# Patient Record
Sex: MALE | Race: WHITE | NOT HISPANIC OR LATINO | Employment: FULL TIME | URBAN - METROPOLITAN AREA
[De-identification: names, ages, dates, MRNs, and addresses within clinical notes are randomized per-mention and may not be internally consistent; named-entity substitution may affect disease eponyms.]

---

## 2021-05-06 ENCOUNTER — PATIENT MESSAGE (OUTPATIENT)
Dept: RESEARCH | Facility: HOSPITAL | Age: 61
End: 2021-05-06

## 2024-07-01 ENCOUNTER — LAB VISIT (OUTPATIENT)
Dept: LAB | Facility: HOSPITAL | Age: 64
End: 2024-07-01
Attending: INTERNAL MEDICINE
Payer: COMMERCIAL

## 2024-07-01 ENCOUNTER — HOSPITAL ENCOUNTER (OUTPATIENT)
Dept: ENDOCRINOLOGY | Facility: CLINIC | Age: 64
Discharge: HOME | End: 2024-07-01
Attending: INTERNAL MEDICINE
Payer: COMMERCIAL

## 2024-07-01 ENCOUNTER — OFFICE VISIT (OUTPATIENT)
Dept: ENDOCRINOLOGY | Facility: CLINIC | Age: 64
End: 2024-07-01
Payer: COMMERCIAL

## 2024-07-01 VITALS
WEIGHT: 203.06 LBS | HEART RATE: 70 BPM | SYSTOLIC BLOOD PRESSURE: 90 MMHG | BODY MASS INDEX: 28.32 KG/M2 | OXYGEN SATURATION: 98 % | DIASTOLIC BLOOD PRESSURE: 60 MMHG

## 2024-07-01 DIAGNOSIS — E04.1 THYROID NODULE: ICD-10-CM

## 2024-07-01 DIAGNOSIS — E04.1 THYROID NODULE: Primary | ICD-10-CM

## 2024-07-01 LAB
T4 FREE SERPL-MCNC: 0.92 NG/DL (ref 0.71–1.51)
TSH SERPL DL<=0.005 MIU/L-ACNC: 1.32 UIU/ML (ref 0.4–4)

## 2024-07-01 PROCEDURE — 99999 PR PBB SHADOW E&M-NEW PATIENT-LVL III: CPT | Mod: PBBFAC,,, | Performed by: INTERNAL MEDICINE

## 2024-07-01 PROCEDURE — 36415 COLL VENOUS BLD VENIPUNCTURE: CPT | Performed by: INTERNAL MEDICINE

## 2024-07-01 PROCEDURE — 3078F DIAST BP <80 MM HG: CPT | Mod: CPTII,S$GLB,, | Performed by: INTERNAL MEDICINE

## 2024-07-01 PROCEDURE — 88173 CYTOPATH EVAL FNA REPORT: CPT | Performed by: STUDENT IN AN ORGANIZED HEALTH CARE EDUCATION/TRAINING PROGRAM

## 2024-07-01 PROCEDURE — 84443 ASSAY THYROID STIM HORMONE: CPT | Performed by: INTERNAL MEDICINE

## 2024-07-01 PROCEDURE — 1159F MED LIST DOCD IN RCRD: CPT | Mod: CPTII,S$GLB,, | Performed by: INTERNAL MEDICINE

## 2024-07-01 PROCEDURE — 3008F BODY MASS INDEX DOCD: CPT | Mod: CPTII,S$GLB,, | Performed by: INTERNAL MEDICINE

## 2024-07-01 PROCEDURE — 4010F ACE/ARB THERAPY RXD/TAKEN: CPT | Mod: CPTII,S$GLB,, | Performed by: INTERNAL MEDICINE

## 2024-07-01 PROCEDURE — 3074F SYST BP LT 130 MM HG: CPT | Mod: CPTII,S$GLB,, | Performed by: INTERNAL MEDICINE

## 2024-07-01 PROCEDURE — 10005 FNA BX W/US GDN 1ST LES: CPT | Mod: S$GLB,,, | Performed by: INTERNAL MEDICINE

## 2024-07-01 PROCEDURE — 84439 ASSAY OF FREE THYROXINE: CPT | Performed by: INTERNAL MEDICINE

## 2024-07-01 PROCEDURE — 99203 OFFICE O/P NEW LOW 30 MIN: CPT | Mod: S$GLB,,, | Performed by: INTERNAL MEDICINE

## 2024-07-01 RX ORDER — TAMSULOSIN HYDROCHLORIDE 0.4 MG/1
0.4 CAPSULE ORAL DAILY
COMMUNITY

## 2024-07-01 RX ORDER — HYDROCHLOROTHIAZIDE 25 MG/1
25 TABLET ORAL DAILY
COMMUNITY

## 2024-07-01 RX ORDER — IRBESARTAN 75 MG/1
75 TABLET ORAL NIGHTLY
COMMUNITY

## 2024-07-01 NOTE — ASSESSMENT & PLAN NOTE
I have reviewed management options including observation or FNA.  All of the patients questions were answered.     Discussed indications for a FNA  Discussed possible FNA results (benign, FLUS,  follicular or hurthle lesion, suspicious for cancer, cancer and non diagnostic)     Reviewed that a non diagnostic or FLUS would require a repeat FNA or molecular marker testing.    Will proceed with FNA of left inferior thyroid nodule    Discussed indications for repeat FNA as well as surgical indications (abnormal FNA, compressive symptoms or interval change)     If FNA is negative then plan follow up in 1 year with TSH and thyroid u/s prior

## 2024-07-01 NOTE — PROGRESS NOTES
NEW PATIENT VISIT    Subjective:      Chief Complaint:  Thyroid nodule    HPI: Jason Sosa Jr. is a 63 y.o. male who is here for thyroid nodules      With regards to the thyroid nodule:    Thyroid nodules originally found on an outside imaging test, and subsequently evaluated by ultrasound on 4/10/2024.    Last ultrasound in 4/2024 was independently reviewed:     1.4 x 1.0 x 0.9 cm Left inferior lobe nodule. The nodule is solid with densely hypoechoic echotexture. Vascularity is Grade 2 (peripheral). Borders are regular.  There are punctate echogenic foci possibly representing microcalcifications versus comet tail artifact.      Had esophageal dilation at one point for stuck pills.    No   Yes  [x]    [] Preexisting thyroid disease    Compressive Symptoms:  No  Yes  [x]    [] Anterior neck pressure  [x]    [] Dysphagia  [x]    [] Voice changes    Risk Factors:  No   Yes  [x]    [] Radiation to head or neck for any treatment of cancer or exposure to radiation  [x]    [] Personal history of colon or breast cancer  [x]    [] Tobacco use  [x]    [] Family history of thyroid cancer      Mom had breast cancer       Previous biopsy results: None      Lab Results   Component Value Date    TSH 1.317 07/01/2024         Objective:     Vitals:    07/01/24 0840   BP: 90/60   Pulse: 70         BP Readings from Last 5 Encounters:   07/01/24 90/60   10/23/15 136/90         Physical Exam  Vitals and nursing note reviewed.   Constitutional:       General: He is not in acute distress.     Appearance: He is well-developed. He is not ill-appearing.   HENT:      Head: Normocephalic and atraumatic.   Neck:      Thyroid: No thyromegaly.      Trachea: No tracheal deviation.   Pulmonary:      Effort: Pulmonary effort is normal. No respiratory distress.   Neurological:      Mental Status: He is alert and oriented to person, place, and time.      Coordination: Coordination normal.   Psychiatric:         Mood and Affect: Mood normal.          Behavior: Behavior normal.           Wt Readings from Last 3 Encounters:   07/01/24 0840 92.1 kg (203 lb 0.7 oz)   10/23/15 1006 83.1 kg (183 lb 3.2 oz)       Assessment/Plan:     Thyroid nodule  I have reviewed management options including observation or FNA.  All of the patients questions were answered.     Discussed indications for a FNA  Discussed possible FNA results (benign, FLUS,  follicular or hurthle lesion, suspicious for cancer, cancer and non diagnostic)     Reviewed that a non diagnostic or FLUS would require a repeat FNA or molecular marker testing.    Will proceed with FNA of left inferior thyroid nodule    Discussed indications for repeat FNA as well as surgical indications (abnormal FNA, compressive symptoms or interval change)     If FNA is negative then plan follow up in 1 year with TSH and thyroid u/s prior       Follow up in about 1 year (around 7/1/2025).

## 2024-07-03 ENCOUNTER — PATIENT MESSAGE (OUTPATIENT)
Dept: ENDOCRINOLOGY | Facility: HOSPITAL | Age: 64
End: 2024-07-03
Payer: COMMERCIAL

## 2024-07-03 DIAGNOSIS — E04.1 THYROID NODULE: Primary | ICD-10-CM

## 2024-07-03 LAB
FINAL PATHOLOGIC DIAGNOSIS: ABNORMAL
Lab: ABNORMAL

## 2024-07-03 NOTE — TELEPHONE ENCOUNTER
Please schedule repeat FNA in ~6 weeks.  
navigational cues/verbal cues/nonverbal cues (demo/gestures)/1 person assist

## 2024-08-15 ENCOUNTER — HOSPITAL ENCOUNTER (OUTPATIENT)
Dept: ENDOCRINOLOGY | Facility: CLINIC | Age: 64
Discharge: HOME | End: 2024-08-15
Attending: INTERNAL MEDICINE
Payer: COMMERCIAL

## 2024-08-15 DIAGNOSIS — E04.1 THYROID NODULE: Primary | ICD-10-CM

## 2024-08-15 PROCEDURE — 10005 FNA BX W/US GDN 1ST LES: CPT | Mod: S$GLB,,, | Performed by: INTERNAL MEDICINE

## 2024-08-20 ENCOUNTER — PATIENT MESSAGE (OUTPATIENT)
Dept: ENDOCRINOLOGY | Facility: HOSPITAL | Age: 64
End: 2024-08-20
Payer: COMMERCIAL

## 2024-08-27 ENCOUNTER — CLINICAL SUPPORT (OUTPATIENT)
Dept: ENDOCRINOLOGY | Facility: CLINIC | Age: 64
End: 2024-08-27
Payer: COMMERCIAL

## 2024-08-27 DIAGNOSIS — E04.1 THYROID NODULE: Primary | ICD-10-CM

## 2024-08-27 LAB — MISCELLANEOUS TEST NAME: NORMAL

## 2024-09-05 ENCOUNTER — PATIENT MESSAGE (OUTPATIENT)
Dept: ENDOCRINOLOGY | Facility: CLINIC | Age: 64
End: 2024-09-05
Payer: COMMERCIAL

## 2024-09-05 DIAGNOSIS — E04.1 THYROID NODULE: Primary | ICD-10-CM

## 2024-10-31 DIAGNOSIS — Z82.49 FAMILY HISTORY OF ABDOMINAL AORTIC ANEURYSM: Primary | ICD-10-CM

## 2024-12-05 ENCOUNTER — HOSPITAL ENCOUNTER (OUTPATIENT)
Dept: ENDOCRINOLOGY | Facility: CLINIC | Age: 64
Discharge: HOME | End: 2024-12-05
Attending: INTERNAL MEDICINE
Payer: COMMERCIAL

## 2024-12-05 DIAGNOSIS — E04.1 THYROID NODULE: ICD-10-CM

## 2024-12-05 PROCEDURE — 76536 US EXAM OF HEAD AND NECK: CPT | Mod: S$GLB,,, | Performed by: INTERNAL MEDICINE

## 2024-12-18 ENCOUNTER — PATIENT MESSAGE (OUTPATIENT)
Dept: ENDOCRINOLOGY | Facility: CLINIC | Age: 64
End: 2024-12-18
Payer: COMMERCIAL

## 2025-01-07 ENCOUNTER — OFFICE VISIT (OUTPATIENT)
Dept: OTOLARYNGOLOGY | Facility: CLINIC | Age: 65
End: 2025-01-07
Payer: COMMERCIAL

## 2025-01-07 VITALS
HEART RATE: 64 BPM | WEIGHT: 202.81 LBS | SYSTOLIC BLOOD PRESSURE: 136 MMHG | HEIGHT: 71 IN | BODY MASS INDEX: 28.39 KG/M2 | DIASTOLIC BLOOD PRESSURE: 82 MMHG

## 2025-01-07 DIAGNOSIS — R13.10 DYSPHAGIA, UNSPECIFIED TYPE: Chronic | ICD-10-CM

## 2025-01-07 DIAGNOSIS — K21.9 LARYNGOPHARYNGEAL REFLUX (LPR): Primary | Chronic | ICD-10-CM

## 2025-01-07 DIAGNOSIS — R09.A2 GLOBUS SENSATION: Chronic | ICD-10-CM

## 2025-01-07 PROCEDURE — 3008F BODY MASS INDEX DOCD: CPT | Mod: CPTII,S$GLB,, | Performed by: OTOLARYNGOLOGY

## 2025-01-07 PROCEDURE — 99999 PR PBB SHADOW E&M-EST. PATIENT-LVL III: CPT | Mod: PBBFAC,,, | Performed by: OTOLARYNGOLOGY

## 2025-01-07 PROCEDURE — 3075F SYST BP GE 130 - 139MM HG: CPT | Mod: CPTII,S$GLB,, | Performed by: OTOLARYNGOLOGY

## 2025-01-07 PROCEDURE — 31575 DIAGNOSTIC LARYNGOSCOPY: CPT | Mod: S$GLB,,, | Performed by: OTOLARYNGOLOGY

## 2025-01-07 PROCEDURE — 99204 OFFICE O/P NEW MOD 45 MIN: CPT | Mod: 25,S$GLB,, | Performed by: OTOLARYNGOLOGY

## 2025-01-07 PROCEDURE — 3288F FALL RISK ASSESSMENT DOCD: CPT | Mod: CPTII,S$GLB,, | Performed by: OTOLARYNGOLOGY

## 2025-01-07 PROCEDURE — 1159F MED LIST DOCD IN RCRD: CPT | Mod: CPTII,S$GLB,, | Performed by: OTOLARYNGOLOGY

## 2025-01-07 PROCEDURE — 3079F DIAST BP 80-89 MM HG: CPT | Mod: CPTII,S$GLB,, | Performed by: OTOLARYNGOLOGY

## 2025-01-07 PROCEDURE — 1101F PT FALLS ASSESS-DOCD LE1/YR: CPT | Mod: CPTII,S$GLB,, | Performed by: OTOLARYNGOLOGY

## 2025-01-07 PROCEDURE — 1160F RVW MEDS BY RX/DR IN RCRD: CPT | Mod: CPTII,S$GLB,, | Performed by: OTOLARYNGOLOGY

## 2025-01-07 RX ORDER — ROSUVASTATIN CALCIUM 10 MG/1
10 TABLET, COATED ORAL DAILY
COMMUNITY

## 2025-01-07 RX ORDER — ESOMEPRAZOLE MAGNESIUM 40 MG/1
40 CAPSULE, DELAYED RELEASE ORAL
Qty: 30 CAPSULE | Refills: 3 | Status: SHIPPED | OUTPATIENT
Start: 2025-01-07 | End: 2025-05-07

## 2025-01-07 NOTE — PATIENT INSTRUCTIONS
Recommend starting esomeprazole to control GERD/LPR symptoms.     The patient will see his GI doctor to reassess the esophagus.      I did mention the osteophyte to the patient, and he will mention this to GI to assess for whether there may be other osteophytes lower that could be contributing to his symptoms.

## 2025-01-07 NOTE — PROGRESS NOTES
"  Chief Complaint   Patient presents with    Consult     Pt stated that he has trouble swallowing        HPI: Jason Sosa Jr. is a 64 y.o. male who has been self referred for a few months history of dysphagia and globus sensation.  He reports that he has been noticing that pills are more difficult to swallow and feel that they get "hung up" in the esophagus near the low part of the neck.  His voice is not progressively worsening over this time. There are not pitch breaks or cracks. There is not vocal fatigue. He has dysphagia, but no odynophagia, throat pain, and otalgia.  There is no hemoptysis or hematemesis. He is breathing well.     He denies throat clearing and cough. He has heartburn and reflux.  He has had esophageal dilation previously- saw Dr. Mora with GI at [facility].  He additionally has a history of hiatal hernia and previous peptic ulcer disease.  He is not currently on any medications for reflux.                No past medical history on file.  Social History     Socioeconomic History    Marital status:    Tobacco Use    Smoking status: Never    Smokeless tobacco: Never   Substance and Sexual Activity    Alcohol use: Yes     Comment: occasional     Social Drivers of Health     Financial Resource Strain: Patient Declined (6/30/2024)    Overall Financial Resource Strain (CARDIA)     Difficulty of Paying Living Expenses: Patient declined   Food Insecurity: Patient Declined (6/30/2024)    Hunger Vital Sign     Worried About Running Out of Food in the Last Year: Patient declined     Ran Out of Food in the Last Year: Patient declined   Physical Activity: Insufficiently Active (6/30/2024)    Exercise Vital Sign     Days of Exercise per Week: 4 days     Minutes of Exercise per Session: 20 min   Stress: Patient Declined (6/30/2024)    [employer] - Occupational Stress Questionnaire     Feeling of Stress : Patient declined   Housing Stability: Unknown (6/30/2024)    Housing " Stability Vital Sign     Unable to Pay for Housing in the Last Year: Patient declined     No past surgical history on file.  No family history on file.        Review of Systems  General: negative for chills, fever or weight loss  Psychological: negative for mood changes or depression  Ophthalmic: negative for blurry vision, photophobia or eye pain  ENT: see HPI  Respiratory: no cough, shortness of breath, or wheezing  Cardiovascular: no chest pain or dyspnea on exertion  Gastrointestinal: no abdominal pain, change in bowel habits, or black/ bloody stools  Musculoskeletal: negative for gait disturbance or muscular weakness  Neurological: no syncope or seizures; no ataxia  Dermatological: negative for pruritis,  rash and jaundice  Hematologic/lymphatic: no easy bruising, no new adenopathy      Physical Exam:    Vitals:    01/07/25 1326   BP: 136/82   Pulse: 64         Constitutional:   He is oriented to person, place, and time. Vital signs are normal. He appears well-developed and well-nourished. He appears alert. He is cooperative. Normal speech.      Head:  Normocephalic and atraumatic. Salivary glands normal.  Facial strength is normal.      Ears:    Right Ear: Tympanic membrane is not erythematous and not retracted. No middle ear effusion.   Left Ear: Tympanic membrane is not erythematous and not retracted.  No middle ear effusion.     Nose:  Mucosal edema and rhinorrhea present. No septal deviation or polyps. Turbinates abnormal and turbinate hypertrophy (3+, boggy, congested mucosa).  Right sinus exhibits no maxillary sinus tenderness and no frontal sinus tenderness. Left sinus exhibits no maxillary sinus tenderness and no frontal sinus tenderness.     Mouth/Throat  Oropharynx clear and moist without lesions or asymmetry, normal uvula midline, lips, teeth, and gums normal and oropharynx normal. No uvula swelling, oral lesions, trismus or mucous membrane lesions. No oropharyngeal exudate, posterior oropharyngeal  edema or posterior oropharyngeal erythema. Mirror exam not performed due to patient tolerance.  Mirror exam not performed due to patient tolerance.      Neck:  Neck normal without thyromegaly masses, asymmetry, normal tracheal structure, crepitus, and tenderness, thyroid normal, trachea normal, phonation normal, full range of motion with neck supple and no adenopathy. No JVD present. Carotid bruit is not present. No thyroid mass and no thyromegaly present.     He has no cervical adenopathy.     Cardiovascular:    Normal rate, regular rhythm and rate and rhythm, heart sounds, and pulses normal.              Pulmonary/Chest:   Effort and breath sounds normal.     Psychiatric:   He has a normal mood and affect. His speech is normal and behavior is normal.     Neurological:   He is alert and oriented to person, place, and time. He has neurological normal, alert and oriented. No cranial nerve deficit.     Skin:   No abrasions, lacerations, lesions, or rashes.         Laryngoscopy    Date/Time: 1/7/2025 1:40 PM    Performed by: Fernanda Olivera MD  Authorized by: Fernanda Olivera MD    Consent Done?:  Yes (Verbal)  Anesthesia:     Local anesthetic:  Topical anesthetic    Patient tolerance:  Patient tolerated the procedure well with no immediate complications    Decongestion performed?: Yes    Laryngoscopy:     Areas examined:  Nasal cavities, vocal cords, nasopharynx, oropharynx, hypopharynx and larynx  Nose External:      No external nasal deformity  Nose Intranasal:      Mucosa no polyps     Mucosa ulcers not present     No mucosa lesions present     No septum gross deformity     Turbinates not enlarged  Nasopharynx:      No mucosa lesions     Adenoids not present     Posterior choanae patent     Eustachian tube patent  Larynx/hypopharynx:      No epiglottis lesions     No epiglottis edema     No AE folds lesions     No vocal cord polyps     Equal and normal bilateral     No hypopharynx lesions     No piriform sinus  pooling     No piriform sinus lesions     Post cricoid edema     Post cricoid erythema     Sphenoethmoid recesses clear bilaterally.  Osteophyte noted posterior pharynx just above the level of the larynx.  Nonobstructive.          Assessment:    ICD-10-CM ICD-9-CM    1. Laryngopharyngeal reflux (LPR)  K21.9 478.79 esomeprazole (NEXIUM) 40 MG capsule      2. Globus sensation  R09.A2 784.99       3. Dysphagia, unspecified type  R13.10 787.20         The primary encounter diagnosis was Laryngopharyngeal reflux (LPR). Diagnoses of Globus sensation and Dysphagia, unspecified type were also pertinent to this visit.      Plan:    I discussed with the patient that he should restart Nexium daily for his history of reflux symptoms as well as ongoing dysphagia and globus sensation, as they are likely due to this issue.    Given his previous history of hiatal hernia, peptic ulcer disease, and need for esophageal dilation, he should return to his gastroenterologist for re-evaluation and recommendations for therapy.    I did note the osteophyte in the posterior pharynx, , though I do not feel this is the cause of his current symptoms, but it is possible he has further osteophytes in the lower cervical spine that may be affecting swallowing, but he should see GI 1st and side what their workup will be, and then further workup can be done with swallow study, etc. if needed.    Fernanda Olivera MD